# Patient Record
Sex: FEMALE | RURAL
[De-identification: names, ages, dates, MRNs, and addresses within clinical notes are randomized per-mention and may not be internally consistent; named-entity substitution may affect disease eponyms.]

---

## 2023-06-23 ENCOUNTER — APPOINTMENT (RX ONLY)
Dept: RURAL CLINIC 3 | Facility: CLINIC | Age: 73
Setting detail: DERMATOLOGY
End: 2023-06-23

## 2023-06-23 DIAGNOSIS — L82.1 OTHER SEBORRHEIC KERATOSIS: ICD-10-CM

## 2023-06-23 DIAGNOSIS — L65.0 TELOGEN EFFLUVIUM: ICD-10-CM | Status: INADEQUATELY CONTROLLED

## 2023-06-23 DIAGNOSIS — R20.2 PARESTHESIA OF SKIN: ICD-10-CM

## 2023-06-23 DIAGNOSIS — D18.0 HEMANGIOMA: ICD-10-CM

## 2023-06-23 PROBLEM — D18.01 HEMANGIOMA OF SKIN AND SUBCUTANEOUS TISSUE: Status: ACTIVE | Noted: 2023-06-23

## 2023-06-23 PROCEDURE — ? COUNSELING

## 2023-06-23 PROCEDURE — 99203 OFFICE O/P NEW LOW 30 MIN: CPT

## 2023-06-23 PROCEDURE — ? TREATMENT REGIMEN

## 2023-06-23 ASSESSMENT — LOCATION SIMPLE DESCRIPTION DERM
LOCATION SIMPLE: LEFT CALF
LOCATION SIMPLE: LEFT UPPER BACK
LOCATION SIMPLE: RIGHT SCALP
LOCATION SIMPLE: RIGHT UPPER BACK

## 2023-06-23 ASSESSMENT — LOCATION ZONE DERM
LOCATION ZONE: SCALP
LOCATION ZONE: LEG
LOCATION ZONE: TRUNK

## 2023-06-23 ASSESSMENT — LOCATION DETAILED DESCRIPTION DERM
LOCATION DETAILED: LEFT SUPERIOR UPPER BACK
LOCATION DETAILED: RIGHT MID-UPPER BACK
LOCATION DETAILED: LEFT PROXIMAL CALF
LOCATION DETAILED: RIGHT MEDIAL FRONTAL SCALP
LOCATION DETAILED: LEFT INFERIOR UPPER BACK

## 2023-06-23 ASSESSMENT — PAIN INTENSITY VAS: HOW INTENSE IS YOUR PAIN 0 BEING NO PAIN, 10 BEING THE MOST SEVERE PAIN POSSIBLE?: NO PAIN

## 2023-06-23 NOTE — PROCEDURE: TREATMENT REGIMEN
Samples Given: CeraVe- Itch Relief Moisturizing Cream
Detail Level: Zone
Plan: Discussed PO minoxidil if no improvement with above
Otc Regimen: Hair, skin, nails supplement\\nMinoxidil (Rogaine) 5% foam as directed on packaging

## 2024-03-15 ENCOUNTER — HOSPITAL ENCOUNTER (EMERGENCY)
Facility: HOSPITAL | Age: 74
Discharge: HOME | End: 2024-03-15
Attending: EMERGENCY MEDICINE
Payer: MEDICARE

## 2024-03-15 VITALS
RESPIRATION RATE: 16 BRPM | SYSTOLIC BLOOD PRESSURE: 168 MMHG | TEMPERATURE: 98.6 F | HEIGHT: 62 IN | DIASTOLIC BLOOD PRESSURE: 98 MMHG | OXYGEN SATURATION: 97 % | HEART RATE: 70 BPM | BODY MASS INDEX: 23 KG/M2 | WEIGHT: 125 LBS

## 2024-03-15 DIAGNOSIS — K21.9 GASTROESOPHAGEAL REFLUX DISEASE, UNSPECIFIED WHETHER ESOPHAGITIS PRESENT: ICD-10-CM

## 2024-03-15 DIAGNOSIS — F41.8 DEPRESSION WITH ANXIETY: Primary | ICD-10-CM

## 2024-03-15 LAB
ALBUMIN SERPL-MCNC: 4.1 G/DL (ref 3.5–5.2)
ALBUMIN/GLOB SERPL: 1.5 G/DL
ALP SERPL-CCNC: 73 U/L (ref 39–117)
ALT SERPL W P-5'-P-CCNC: 13 U/L (ref 1–33)
ANION GAP SERPL CALCULATED.3IONS-SCNC: 11.7 MMOL/L (ref 5–15)
AST SERPL-CCNC: 17 U/L (ref 1–32)
BASOPHILS # BLD AUTO: 0.04 10*3/MM3 (ref 0–0.2)
BASOPHILS NFR BLD AUTO: 0.6 % (ref 0–1.5)
BILIRUB SERPL-MCNC: 0.2 MG/DL (ref 0–1.2)
BILIRUB UR QL STRIP: NEGATIVE
BUN SERPL-MCNC: 20 MG/DL (ref 8–23)
BUN/CREAT SERPL: 24.1 (ref 7–25)
CALCIUM SPEC-SCNC: 8.9 MG/DL (ref 8.6–10.5)
CHLORIDE SERPL-SCNC: 102 MMOL/L (ref 98–107)
CLARITY UR: CLEAR
CO2 SERPL-SCNC: 22.3 MMOL/L (ref 22–29)
COLOR UR: YELLOW
CREAT SERPL-MCNC: 0.83 MG/DL (ref 0.57–1)
DEPRECATED RDW RBC AUTO: 40.6 FL (ref 37–54)
EGFRCR SERPLBLD CKD-EPI 2021: 74.5 ML/MIN/1.73
EOSINOPHIL # BLD AUTO: 0.01 10*3/MM3 (ref 0–0.4)
EOSINOPHIL NFR BLD AUTO: 0.1 % (ref 0.3–6.2)
ERYTHROCYTE [DISTWIDTH] IN BLOOD BY AUTOMATED COUNT: 12.6 % (ref 12.3–15.4)
GLOBULIN UR ELPH-MCNC: 2.8 GM/DL
GLUCOSE SERPL-MCNC: 95 MG/DL (ref 65–99)
GLUCOSE UR STRIP-MCNC: NEGATIVE MG/DL
HCT VFR BLD AUTO: 37.4 % (ref 34–46.6)
HGB BLD-MCNC: 13.1 G/DL (ref 12–15.9)
HGB UR QL STRIP.AUTO: NEGATIVE
IMM GRANULOCYTES # BLD AUTO: 0.01 10*3/MM3 (ref 0–0.05)
IMM GRANULOCYTES NFR BLD AUTO: 0.1 % (ref 0–0.5)
KETONES UR QL STRIP: NEGATIVE
LEUKOCYTE ESTERASE UR QL STRIP.AUTO: NEGATIVE
LIPASE SERPL-CCNC: 38 U/L (ref 13–60)
LYMPHOCYTES # BLD AUTO: 1 10*3/MM3 (ref 0.7–3.1)
LYMPHOCYTES NFR BLD AUTO: 14.8 % (ref 19.6–45.3)
MAGNESIUM SERPL-MCNC: 2.1 MG/DL (ref 1.6–2.4)
MCH RBC QN AUTO: 31 PG (ref 26.6–33)
MCHC RBC AUTO-ENTMCNC: 35 G/DL (ref 31.5–35.7)
MCV RBC AUTO: 88.6 FL (ref 79–97)
MONOCYTES # BLD AUTO: 0.28 10*3/MM3 (ref 0.1–0.9)
MONOCYTES NFR BLD AUTO: 4.1 % (ref 5–12)
NEUTROPHILS NFR BLD AUTO: 5.41 10*3/MM3 (ref 1.7–7)
NEUTROPHILS NFR BLD AUTO: 80.3 % (ref 42.7–76)
NITRITE UR QL STRIP: NEGATIVE
NRBC BLD AUTO-RTO: 0 /100 WBC (ref 0–0.2)
PH UR STRIP.AUTO: 5.5 [PH] (ref 4.5–8)
PLATELET # BLD AUTO: 241 10*3/MM3 (ref 140–450)
PMV BLD AUTO: 9.1 FL (ref 6–12)
POTASSIUM SERPL-SCNC: 3.9 MMOL/L (ref 3.5–5.2)
PROT SERPL-MCNC: 6.9 G/DL (ref 6–8.5)
PROT UR QL STRIP: NEGATIVE
RBC # BLD AUTO: 4.22 10*6/MM3 (ref 3.77–5.28)
SODIUM SERPL-SCNC: 136 MMOL/L (ref 136–145)
SP GR UR STRIP: <=1.005 (ref 1–1.03)
TSH SERPL DL<=0.05 MIU/L-ACNC: 2.37 UIU/ML (ref 0.27–4.2)
UROBILINOGEN UR QL STRIP: NORMAL
WBC NRBC COR # BLD AUTO: 6.75 10*3/MM3 (ref 3.4–10.8)

## 2024-03-15 PROCEDURE — 83735 ASSAY OF MAGNESIUM: CPT | Performed by: EMERGENCY MEDICINE

## 2024-03-15 PROCEDURE — 99284 EMERGENCY DEPT VISIT MOD MDM: CPT

## 2024-03-15 PROCEDURE — 80053 COMPREHEN METABOLIC PANEL: CPT | Performed by: EMERGENCY MEDICINE

## 2024-03-15 PROCEDURE — 81003 URINALYSIS AUTO W/O SCOPE: CPT | Performed by: EMERGENCY MEDICINE

## 2024-03-15 PROCEDURE — 84443 ASSAY THYROID STIM HORMONE: CPT | Performed by: EMERGENCY MEDICINE

## 2024-03-15 PROCEDURE — 36415 COLL VENOUS BLD VENIPUNCTURE: CPT

## 2024-03-15 PROCEDURE — 85025 COMPLETE CBC W/AUTO DIFF WBC: CPT | Performed by: EMERGENCY MEDICINE

## 2024-03-15 PROCEDURE — 83690 ASSAY OF LIPASE: CPT | Performed by: EMERGENCY MEDICINE

## 2024-03-15 RX ORDER — LORAZEPAM 0.5 MG/1
0.5 TABLET ORAL 2 TIMES DAILY PRN
Qty: 12 TABLET | Refills: 0 | Status: SHIPPED | OUTPATIENT
Start: 2024-03-15

## 2024-03-15 RX ORDER — LIDOCAINE HYDROCHLORIDE 20 MG/ML
10 SOLUTION OROPHARYNGEAL ONCE
Status: COMPLETED | OUTPATIENT
Start: 2024-03-15 | End: 2024-03-15

## 2024-03-15 RX ORDER — ALUMINA, MAGNESIA, AND SIMETHICONE 2400; 2400; 240 MG/30ML; MG/30ML; MG/30ML
30 SUSPENSION ORAL ONCE
Status: COMPLETED | OUTPATIENT
Start: 2024-03-15 | End: 2024-03-15

## 2024-03-15 RX ORDER — HYDROXYZINE HYDROCHLORIDE 10 MG/1
10 TABLET, FILM COATED ORAL ONCE
Status: COMPLETED | OUTPATIENT
Start: 2024-03-15 | End: 2024-03-15

## 2024-03-15 RX ORDER — PANTOPRAZOLE SODIUM 40 MG/1
40 TABLET, DELAYED RELEASE ORAL DAILY
Qty: 30 TABLET | Refills: 0 | Status: SHIPPED | OUTPATIENT
Start: 2024-03-15

## 2024-03-15 RX ADMIN — HYDROXYZINE HYDROCHLORIDE 10 MG: 10 TABLET, FILM COATED ORAL at 17:23

## 2024-03-15 RX ADMIN — ALUMINUM HYDROXIDE, MAGNESIUM HYDROXIDE, AND DIMETHICONE 30 ML: 400; 400; 40 SUSPENSION ORAL at 15:46

## 2024-03-15 RX ADMIN — LIDOCAINE HYDROCHLORIDE 10 ML: 20 SOLUTION ORAL at 15:46

## 2024-03-15 NOTE — ED NOTES
Called the [facility] to find out why a call hadn't been returned, the [facility] informed me that they never received the fax that was sent to them. Fax is being resent.

## 2024-03-15 NOTE — ED NOTES
Pt remains tearful. States no effect after meds administered. Sister at bedside, attentive to pt. Repeat VS deferred due to pt emotional state. Previous VS stable

## 2024-03-15 NOTE — ED NOTES
"Pt crying uncontrollably. States \"ever since I ate a bite of cracker my stomach isn't feeling right and I'm having another one of my episodes\". Asked pt what she thinks may help her. Pt states has rx for ativan at home which she takes occasionally with some relief. Discussed pt status with Dr. Casiano. Pt waiting for eval by [facility].   "

## 2024-03-15 NOTE — ED NOTES
"Pt crying. States \"I've taken hydroxyzine before and it doesn't work. I can't go home like this. Someone needs to find out what's wrong with me\".  Pt's sister states pt \"has not been right ever since she was diagnosed with diverticulitis\"  "

## 2024-03-15 NOTE — ED NOTES
"Up to rest room and back to bed. Pt states feels \"a little bit better, I guess\". No longer tearful. Speaking with sister at bedside. Awaiting test results then plan for eval by [facility].  "

## 2024-03-15 NOTE — ED NOTES
Patient given [facility] outpatient level of care recommendation sheet as well as 2 different psychiatry providers name, number, and address.

## 2024-03-15 NOTE — ED PROVIDER NOTES
Subjective   History of Present Illness  73-year-old female presents emergency room complaining of chronic depression anxiety and gastroesophageal reflux disease.  Patient is visiting from [place] and has been in [place] for approximately 1 month with no plans to leave as of yet.  Since she has been here the family states that she has had worsening depression and anxiety and epigastric pain.  Patient has a past medical history of gastroesophageal reflux disease for which she was told to take over-the-counter PPIs for which she states she has not done so because she did not think it was helping.  Patient is also been on several psychiatric medications and recently saw a psychiatrist here in [place] approximate 2 weeks ago who changed her medications.  Patient states that they have not helped yet and she is concerned that she is getting worse.  Patient has not been in contact with that psychiatrist in several weeks.  Patient denies SI or HI.  Patient describes midepigastric abdominal pain that is burning and associated with meals.  She states she has had an endoscopy that showed Veras's esophagus and GERD.  Patient has not been taking any medication for such for several months and is concerned why she is having continued symptoms.      Review of Systems   Constitutional:  Negative for activity change, appetite change, chills, diaphoresis, fatigue and fever.   HENT:  Negative for congestion, rhinorrhea and sore throat.    Eyes:  Negative for photophobia and visual disturbance.   Respiratory:  Negative for cough and shortness of breath.    Cardiovascular:  Negative for chest pain, palpitations and leg swelling.   Gastrointestinal:  Positive for abdominal pain. Negative for abdominal distention, diarrhea, nausea and vomiting.   Genitourinary:  Negative for dysuria and flank pain.   Musculoskeletal:  Negative for arthralgias and back pain.   Skin:  Negative for rash.   Neurological:  Negative for dizziness, weakness  and headaches.   Psychiatric/Behavioral:  Positive for behavioral problems. Negative for agitation, self-injury and suicidal ideas. The patient is nervous/anxious.        Past Medical History:   Diagnosis Date    Back injury     broke back in 2022    Veras's esophagus     Breast cancer     Broken wrist     Diverticulitis     Osteoporosis     Serotonin syndrome        Allergies   Allergen Reactions    Prednisone Other (See Comments)     Altered mental status       Past Surgical History:   Procedure Laterality Date    BACK SURGERY      FL UPPER GI ESOPHAGRAM      HYSTERECTOMY         History reviewed. No pertinent family history.    Social History     Socioeconomic History    Marital status: Single   Tobacco Use    Smoking status: Never    Smokeless tobacco: Never   Vaping Use    Vaping status: Never Used   Substance and Sexual Activity    Alcohol use: Not Currently           Objective   Physical Exam  Vitals and nursing note reviewed.   Constitutional:       General: She is not in acute distress.     Appearance: Normal appearance. She is not ill-appearing, toxic-appearing or diaphoretic.      Comments: 73-year-old female who is in no acute distress.  Patient is tearful during exam but is able to speak in full paragraphs without difficulty.   HENT:      Head: Normocephalic and atraumatic.      Nose: Nose normal.      Mouth/Throat:      Mouth: Mucous membranes are moist.   Eyes:      Conjunctiva/sclera: Conjunctivae normal.   Cardiovascular:      Rate and Rhythm: Normal rate and regular rhythm.      Pulses: Normal pulses.   Pulmonary:      Effort: Pulmonary effort is normal.      Breath sounds: Normal breath sounds.   Abdominal:      General: Abdomen is flat.   Musculoskeletal:         General: No swelling. Normal range of motion.      Cervical back: Normal range of motion and neck supple.   Skin:     General: Skin is warm and dry.   Neurological:      General: No focal deficit present.      Mental Status: She is  alert and oriented to person, place, and time.         Procedures           ED Course  ED Course as of 03/15/24 1944   Fri Mar 15, 2024   1642 Lab work is all within normal limits.  [facility] has been paged to see patient. []   1854 I spoke with Marya from [facility] after they had evaluated patient via telemedicine.  She consulted with her psychiatrist Dr. Medina and after discussing with him patient's history and physical feels that patient does not need to be inpatient at this time however he probably does need to follow-up with some type of outpatient therapy.  Initially she stated that they do not have any intensive outpatient therapy available at this time as they have a wait list further treatment and they advised that she follow-up with her primary care physician.  After I explained to them that she is visiting from [place] and has no primary care physician here she stated that she would put together a list of possible programs that they can contact and get an occluded with and Marya will fax it to us.  I agree that patient does not need inpatient therapy at this time however probably does need some outpatient therapy.  Patient alternatively can also return to see the psychiatrist which she saw few weeks ago.  I spoke with patient and family concerning findings and evaluation from [facility] and they state they understand agree with plan of discharge home with follow-up outpatient therapy.  Patient can also return the emergency room for new persistent or worsening symptoms. []   1858 Also patient has a appointment with primary care physician Dr. Dang in 4 days which also may be an avenue of help []   1931 Penelope is faxed a list of intensive outpatient therapy places.  I spoke with patient to discuss lab findings and my discussion with [facility].  Patient now also states that she has an appoint with her psychiatrist in 3 days on Monday.  She is asking for some Klonopin until she can get into see  that physician.  I told patient I would write for some Klonopin for the next several days until she can get into see her psychiatrist.  Patient states she understands agrees with plan.  Patient can also return the emergency room for new persistent or worsening symptoms. []      ED Course User Index  [] Jeffry Casiano MD                                             Medical Decision Making  My differential diagnosis for anxiety and anxiety attacks includes but is not limited to anxiety attacks, anxiety about health, situational anxiety, stress reaction, hyperventilation, diabetic ketoacidosis, amphetamine abuse, methamphetamine abuse, cocaine abuse, hyperthyroidism, antihistamines and grief reaction.     Problems Addressed:  Depression with anxiety: complicated acute illness or injury  Gastroesophageal reflux disease, unspecified whether esophagitis present: complicated acute illness or injury    Amount and/or Complexity of Data Reviewed  Labs: ordered. Decision-making details documented in ED Course.    Risk  OTC drugs.  Prescription drug management.        Final diagnoses:   Depression with anxiety   Gastroesophageal reflux disease, unspecified whether esophagitis present       ED Disposition  ED Disposition       ED Disposition   Discharge    Condition   Stable    Comment   --               Adrián Dang,   [address] [facility] [address] [phone]    Schedule an appointment as soon as possible for a visit       [facility] EMERGENCY DEPARTMENT  [address] [facility] [address] [phone]  Go to   As needed         Medication List        New Prescriptions      pantoprazole 40 MG EC tablet  Commonly known as: PROTONIX  Take 1 tablet by mouth Daily.            Changed      * LORazepam 0.5 MG tablet  Commonly known as: ATIVAN  What changed: Another medication with the same name was added. Make sure you understand how and when to take each.     * LORazepam 0.5 MG  tablet  Commonly known as: ATIVAN  Take 1 tablet by mouth 2 (Two) Times a Day As Needed for Anxiety.  What changed: You were already taking a medication with the same name, and this prescription was added. Make sure you understand how and when to take each.           * This list has 2 medication(s) that are the same as other medications prescribed for you. Read the directions carefully, and ask your doctor or other care provider to review them with you.                   Where to Get Your Medications        These medications were sent to [id number] [place] - [address] - [phone]  - [phone]   [address], [place] [address]      Phone: [phone]   LORazepam 0.5 MG tablet  pantoprazole 40 MG EC tablet            Jeffry Casiano MD  03/15/24 1949

## 2024-03-15 NOTE — ED NOTES
Marya from [facility] contacted ED with zoom link for pt eval. Pt requests that sister remain with her during eval. OK per Marya.

## 2024-03-19 ENCOUNTER — OFFICE VISIT (OUTPATIENT)
Dept: FAMILY MEDICINE CLINIC | Facility: CLINIC | Age: 74
End: 2024-03-19
Payer: MEDICARE

## 2024-03-19 VITALS
HEART RATE: 71 BPM | BODY MASS INDEX: 23 KG/M2 | RESPIRATION RATE: 18 BRPM | DIASTOLIC BLOOD PRESSURE: 78 MMHG | SYSTOLIC BLOOD PRESSURE: 116 MMHG | OXYGEN SATURATION: 99 % | HEIGHT: 62 IN | WEIGHT: 125 LBS

## 2024-03-19 DIAGNOSIS — K21.9 GASTROESOPHAGEAL REFLUX DISEASE WITHOUT ESOPHAGITIS: Primary | ICD-10-CM

## 2024-03-19 DIAGNOSIS — F41.9 ANXIETY AND DEPRESSION: ICD-10-CM

## 2024-03-19 DIAGNOSIS — M81.0 OSTEOPOROSIS WITHOUT CURRENT PATHOLOGICAL FRACTURE, UNSPECIFIED OSTEOPOROSIS TYPE: ICD-10-CM

## 2024-03-19 DIAGNOSIS — F32.A ANXIETY AND DEPRESSION: ICD-10-CM

## 2024-03-19 DIAGNOSIS — K59.00 CONSTIPATION, UNSPECIFIED CONSTIPATION TYPE: ICD-10-CM

## 2024-03-19 RX ORDER — GABAPENTIN 100 MG/1
100 CAPSULE ORAL 3 TIMES DAILY
COMMUNITY

## 2024-03-19 NOTE — PROGRESS NOTES
Adrián Dang,   [facility]  [address]  [place] [id number] [phone]    Subjective      Name Berenice Herndon MRN [id number]    1950 AGE/SEX 73 y.o. / female      Chief Complaint Chief Complaint   Patient presents with    Depression     New patient here to establish care          Visit History for  2024    History of Present Illness  Berenice Herndon is a 73 y.o. female who presented today for Depression (New patient here to establish care )  .   Acid reflux  Ended up going to the emergency room and was told that she had acid reflux. They gave her some medicine for that, but it made her stomach burn. When her stomach gets upset, she gets anxious and more depressed. She is not sure if it is just acid that is causing all her trouble. She wakes up 2 to 3 times a night to go to the bathroom and sometimes she just wakes up and usually goes back to sleep. Sometimes, she lays there for 2 to 3 hours and then goes back to sleep. Her mattress is comfortable. She goes to bed at 9:00 PM and falls asleep at 10:00 PM and wakes up at 7:00 AM. She woke up at 2:00 AM for a while. She tries not to drink water at night, but she drinks a lot of water during the day. She does not nap during the day. She still feels tired when she wakes up in the morning. The only thing that can get her up is half of the caffeine tablet. She is on Protonix.     Anxiety and depression  Has a follow-up appointment with her therapist in 4 weeks. Has always had trouble with depression and has been on Zoloft for a long time. A couple of years ago, they added BuSpar for anxiety because she broke her back. Every time she feels a little twinge, her back will start hurting again, and she gets anxious about it. It got so bad that she had pins and needles everywhere. The BuSpar did help at that time. Therapy started just before winter here, but she broke her arm. She only had a couple of visits, so she has  not seen anyone else. She has not seen a therapist before when she was first on Zoloft. In 11/2023, she started feeling a little under the weather, but in 12/2023, she had a bad year for her. Could not get out due to the snow. Was by herself, her heater quit, her water heater broke, and she broke her wrist. She got through that and was doing okay. Was helping a lady's house for a week, and her hands started shaking and she started feeling really depressed and anxious. Managed to get on the plane and come up here. Thought her symptoms would go away, but it got worse. Went to the psychiatrist, who changed her medications, which is helping a little bit. Has a follow-up appointment with the psychiatrist in 4 weeks. Has been feeling fatigued frequently. Is shaky all over, and her knees feel weak and not steady. She wakes up 2 to 3 times a night to urinate. Sometimes, she goes back to sleep. Sometimes, she lays there for 2 to 3 hours and then goes back to sleep. [place] so she does crabs, knit, quilting, grass, weeds, trimming, and gardening. She has not decided how long she will be living at home. Her sister states that she is afraid to be alone. She lives about 45 minutes from [place], [place]. Her brother lives further away. Her daughter's wife has Clarence's disease, and she cannot be over there all the time. She has to watch that she does not get upset or get sick. Nobody comes out to the house. Her sister states that since she has had diverticulitis, she has never gotten on her feet. She has been tired. She could not eat for a long time. She has not been eating because she was alone. She did not want to cook because she had no water, no heat, and her arm was broken. She worked through breast cancer and only took 1 day off after they removed it. She struggles with joint pain.     Osteoporosis  She is taking calcium, magnesium, D3, vitamin C, and collagen. She stopped taking calcium for a while because  "she ran out. She was taking magnesium, but she started being anxious and depressed.    Constipation.  Is questioning if flaxseed is good.     Health maintenance  She had diverticulitis and was treated with antibiotics. She has to take something for her bowels every day. Her diverticulitis has resolved, but she still has diverticulosis. She gets constipated.        Medications and Allergies   Current Outpatient Medications   Medication Instructions    busPIRone (BUSPAR) 10 MG tablet 1 tablet, Oral, Every 12 Hours Scheduled    gabapentin (NEURONTIN) 100 mg, Oral, 3 Times Daily    LORazepam (ATIVAN) 0.5 mg, Oral, 2 Times Daily PRN, for anxiety    LORazepam (ATIVAN) 0.5 mg, Oral, 2 Times Daily PRN    pantoprazole (PROTONIX) 40 mg, Oral, Daily    sertraline (ZOLOFT) 100 mg, Oral, 2 Times Daily     Allergies   Allergen Reactions    Iodinated Contrast Media Anaphylaxis    Prednisone Other (See Comments)     Altered mental status      I have reviewed the above medications and allergies     Objective:      Vitals Vitals:    03/19/24 1418   BP: 116/78   BP Location: Right arm   Patient Position: Sitting   Cuff Size: Adult   Pulse: 71   Resp: 18   SpO2: 99%   Weight: 56.7 kg (125 lb)   Height: 157.5 cm (62\")     Body mass index is 22.86 kg/m².    Physical Exam  Vitals reviewed.   Constitutional:       General: She is not in acute distress.     Appearance: She is not ill-appearing.   Pulmonary:      Effort: Pulmonary effort is normal.   Psychiatric:         Mood and Affect: Mood normal.         Behavior: Behavior normal.         Thought Content: Thought content normal.         Judgment: Judgment normal.            Assessment/Plan      Issues Addressed/ Plan   Diagnosis Plan   1. Gastroesophageal reflux disease without esophagitis        2. Anxiety and depression        3. Osteoporosis without current pathological fracture, unspecified osteoporosis type        4. Constipation, unspecified constipation type           There are no " Patient Instructions on file for this visit.  BMI is within normal parameters. No other follow-up for BMI required.     1. Acid reflux.  I suspect this is a combination of things. I think being alone, being in that situation, and not having anyone to help her brought her down to a new low that she has not experienced before. Will continue Protonix. Will take probiotics. Can eat Greek yogurt, and take MiraLAX. If her symptoms do not improve, we will refer her to a gastroenterologist.    2. Depression and anxiety.  It sounds like she has bad health anxiety. She will keep her appointment with the psychiatrist. She will see a therapist. She was advised to make a list of what she can do every day to help her feel better and more like herself. I will check her vitamin D and B12 at her next visit.    Osteoporosis.  Her DEXA scan did get a little worse from 2021 to 2023. She will continue calcium, magnesium, D3, vitamin C, and collagen. I will recheck her DEXA scan in the next couple of months.    Constipation.  Was advised to try natural fiber or eating an apple. Was advised not to take flaxseed because it can make diverticulitis worse. Can take Metamucil or FiberCon. Can try MiraLAX.      Follow up  recommended Return in about 3 months (around 6/19/2024).   - Dragon voice recognition software was utilized to complete this chart.  Every reasonable attempt was made to edit and correct the text, however some incorrect words may remain.   Adrián Dang      Transcribed from ambient dictation for Adrián Dang DO by Mary De La Torre.  03/19/24   16:44 EDT    Patient or patient representative verbalized consent to the visit recording.  I have personally performed the services described in this document as transcribed by the above individual, and it is both accurate and complete.

## 2024-03-27 ENCOUNTER — TELEPHONE (OUTPATIENT)
Dept: FAMILY MEDICINE CLINIC | Facility: CLINIC | Age: 74
End: 2024-03-27
Payer: MEDICARE

## 2024-03-27 DIAGNOSIS — K57.92 DIVERTICULITIS: Primary | ICD-10-CM

## 2024-03-27 RX ORDER — AMOXICILLIN AND CLAVULANATE POTASSIUM 875; 125 MG/1; MG/1
1 TABLET, FILM COATED ORAL 2 TIMES DAILY
Qty: 10 TABLET | Refills: 0 | Status: SHIPPED | OUTPATIENT
Start: 2024-03-27 | End: 2024-04-01

## 2024-03-27 NOTE — TELEPHONE ENCOUNTER
"Relay     \"Total white blood cell count was normal which is more of an indicator of infection. However, I will go ahead and send a prescription for antibiotic if she feels she may be getting diverticulitis. \"                "

## 2024-03-27 NOTE — TELEPHONE ENCOUNTER
Hi, this is Lvna Herndon. I was in to see Dr. Dang earlier this week with some abdominal problems and he noticed that my white count was up, but he didn't seem concerned about it, but I'm having abdominal pain. My bloated left quad is tender and I'm pretty sure I have diverticulitis. I've had it before, and I wondered if he would call in a prescription so I don't have to go to the emergency room

## 2024-03-27 NOTE — TELEPHONE ENCOUNTER
Total white blood cell count was normal which is more of an indicator of infection.  However, I will go ahead and send a prescription for antibiotic if she feels she may be getting diverticulitis.

## 2024-04-07 PROBLEM — K21.9 GASTROESOPHAGEAL REFLUX DISEASE WITHOUT ESOPHAGITIS: Status: ACTIVE | Noted: 2024-04-07

## 2024-04-07 PROBLEM — M81.0 OSTEOPOROSIS WITHOUT CURRENT PATHOLOGICAL FRACTURE: Status: ACTIVE | Noted: 2024-04-07

## 2024-04-07 PROBLEM — K59.00 CONSTIPATION: Status: ACTIVE | Noted: 2024-04-07

## 2024-04-07 PROBLEM — F41.9 ANXIETY AND DEPRESSION: Status: ACTIVE | Noted: 2024-04-07

## 2024-04-07 PROBLEM — F32.A ANXIETY AND DEPRESSION: Status: ACTIVE | Noted: 2024-04-07

## 2024-04-09 ENCOUNTER — HOSPITAL ENCOUNTER (OUTPATIENT)
Dept: ULTRASOUND IMAGING | Facility: HOSPITAL | Age: 74
Discharge: HOME | End: 2024-04-09
Admitting: NURSE PRACTITIONER
Payer: MEDICARE

## 2024-04-09 ENCOUNTER — OFFICE VISIT (OUTPATIENT)
Dept: GASTROENTEROLOGY | Facility: CLINIC | Age: 74
End: 2024-04-09
Payer: MEDICARE

## 2024-04-09 VITALS
DIASTOLIC BLOOD PRESSURE: 76 MMHG | HEIGHT: 62 IN | TEMPERATURE: 98.1 F | BODY MASS INDEX: 22.82 KG/M2 | HEART RATE: 88 BPM | SYSTOLIC BLOOD PRESSURE: 110 MMHG | WEIGHT: 124 LBS | OXYGEN SATURATION: 98 %

## 2024-04-09 DIAGNOSIS — K21.9 GASTROESOPHAGEAL REFLUX DISEASE, UNSPECIFIED WHETHER ESOPHAGITIS PRESENT: ICD-10-CM

## 2024-04-09 DIAGNOSIS — R10.11 RIGHT UPPER QUADRANT ABDOMINAL PAIN: ICD-10-CM

## 2024-04-09 DIAGNOSIS — R11.0 NAUSEA: Primary | ICD-10-CM

## 2024-04-09 DIAGNOSIS — K57.92 DIVERTICULITIS: ICD-10-CM

## 2024-04-09 PROCEDURE — 76705 ECHO EXAM OF ABDOMEN: CPT

## 2024-04-09 PROCEDURE — 1159F MED LIST DOCD IN RCRD: CPT | Performed by: NURSE PRACTITIONER

## 2024-04-09 PROCEDURE — 99204 OFFICE O/P NEW MOD 45 MIN: CPT | Performed by: NURSE PRACTITIONER

## 2024-04-09 PROCEDURE — 1160F RVW MEDS BY RX/DR IN RCRD: CPT | Performed by: NURSE PRACTITIONER

## 2024-04-09 RX ORDER — METRONIDAZOLE 500 MG/1
TABLET ORAL
COMMUNITY
Start: 2024-04-01

## 2024-04-09 RX ORDER — OMEPRAZOLE 40 MG/1
40 CAPSULE, DELAYED RELEASE ORAL DAILY
Qty: 30 CAPSULE | Refills: 5 | Status: SHIPPED | OUTPATIENT
Start: 2024-04-09

## 2024-04-09 NOTE — PROGRESS NOTES
"Chief Complaint   Patient presents with    Nausea    Abdominal Pain         History of Present Illness  Patient is a 73-year-old female who presents today forEvaluation.  She has a history of GERD, diverticulitis, and constipation.    Patient reports she was recently treated for diverticulitis.  She had an episode around 4 weeks ago.  This was her third episode.  She felt a clear liquid diet and was treated initially with Augmentin which she did not tolerate and then metronidazole.  Reports with the episode she was experiencing left lower quadrant pain.    Reports the pain has resolved but bowel habits have not fully normalized.  She is having bowel movements daily with no blood or mucus in the stool but feels that she is not digesting properly, stool has been fragmented.    She reports she has also been experiencing significant issues with nausea over the last 2 to 3 months.  It has been constant.  She has had 1 episode of vomiting.  She has had some epigastric and right upper quadrant discomfort.    She has a history of Veras's esophagus.  Previously taking omeprazole for GERD but more recently has been taking pantoprazole.  Would like to switch back to omeprazole.    Virtual last colonoscopy was around 8 years ago.    Reports most recent EGD and CT scan were within the last 4 to 6 months at [facility] in [place] where she lives.  She is currently in the area with family as she has been unwell.  She has been experiencing significant issues with depression and anxiety and shaking and weakness.     Result Review :       Office Visit with Adrián Dang DO (03/19/2024)    Comprehensive Metabolic Panel (03/15/2024 15:54)    CBC & Differential (03/15/2024 15:54)    US Abdomen Complete (06/30/2023 12:27)    Referral for Diverticulosis (03/25/2024)    Vital Signs:   /76   Pulse 88   Temp 98.1 °F (36.7 °C)   Ht 157.5 cm (62\")   Wt 56.2 kg (124 lb)   SpO2 98%   BMI 22.68 kg/m²     Body mass index " is 22.68 kg/m².     Physical Exam  Vitals reviewed.   Constitutional:       General: She is not in acute distress.     Appearance: She is well-developed.   HENT:      Head: Normocephalic and atraumatic.   Pulmonary:      Effort: Pulmonary effort is normal. No respiratory distress.   Abdominal:      General: Abdomen is flat. Bowel sounds are normal.      Palpations: Abdomen is soft.      Tenderness: There is abdominal tenderness in the right upper quadrant. There is guarding.   Skin:     General: Skin is dry.      Coloration: Skin is not pale.   Neurological:      Mental Status: She is alert and oriented to person, place, and time.   Psychiatric:         Thought Content: Thought content normal.           Assessment and Plan    Diagnoses and all orders for this visit:    1. Nausea (Primary)    2. Right upper quadrant abdominal pain  -     US Abdomen Limited; Future    3. Diverticulitis    4. Gastroesophageal reflux disease, unspecified whether esophagitis present    Other orders  -     omeprazole (priLOSEC) 40 MG capsule; Take 1 capsule by mouth Daily.  Dispense: 30 capsule; Refill: 5         Discussion  Patient presents today for evaluation with concerns about nausea, abdominal pain, and recent episode of diverticulitis.  We will obtain her most recent EGD and CT scan for review.  Exam in the office today with significant tenderness in the right upper quadrant.  Will schedule right upper quadrant ultrasound to evaluate the gallbladder.  If this is negative and dependent upon review of records, may consider gastric emptying study to evaluate for gastroparesis.    Diverticulitis resolved after treatment with antibiotics.  Discussed recommendation for high-fiber diet to help prevent recurrence.  Recommended colonoscopy in 6 to 8 weeks to follow-up on the episode.  Patient is uncertain if she will still be here and declined to schedule at this time.    She questions if anxiety, depression, and shaking are related to GI  symptoms.  Do not feel these are caused by any GI disorders and recommended continued follow-up with primary care provider.          Follow Up   Return for Follow up to review results after testing complete.    Patient Instructions   Schedule RUQ ultrasound to evaluate the gallbladder.     For diverticulosis, follow a high-fiber diet.  Consider starting a daily fiber supplement, such as Metamucil or Citrucel, available over-the-counter.     For GERD, continue omeprazole.  Prescription sent to pharmacy.

## 2024-04-09 NOTE — PATIENT INSTRUCTIONS
Schedule RUQ ultrasound to evaluate the gallbladder.     For diverticulosis, follow a high-fiber diet.  Consider starting a daily fiber supplement, such as Metamucil or Citrucel, available over-the-counter.     For GERD, continue omeprazole.  Prescription sent to pharmacy.

## 2024-04-29 ENCOUNTER — TELEPHONE (OUTPATIENT)
Dept: GASTROENTEROLOGY | Facility: CLINIC | Age: 74
End: 2024-04-29
Payer: MEDICARE

## 2024-04-29 NOTE — TELEPHONE ENCOUNTER
Patient left a Voice Message  requesting a call back .    She called [facility] (in [place]) at [phone], to request records of her last EGD done there on 11/01/2023. She was told by Shanna that we had to call them to request.     I called them, spoke with Medical Records, and I was told to fax them a request to F [phone].

## 2024-05-01 ENCOUNTER — TELEPHONE (OUTPATIENT)
Dept: GASTROENTEROLOGY | Facility: CLINIC | Age: 74
End: 2024-05-01
Payer: MEDICARE

## 2024-05-01 NOTE — TELEPHONE ENCOUNTER
Please let patient know that I received her previous EGD.  No need for repeat EGD at this time.  Please check in with patient to see how she is feeling.  If she has had continued nausea and reflux and abdominal pain, would recommend gastric emptying study for further evaluation.

## 2024-05-06 NOTE — TELEPHONE ENCOUNTER
Pt LVM returning call and requesting call back.    I called back, informed patient. She is feeling good, has been asymptomatic for 10-12 days.

## 2024-06-10 ENCOUNTER — OFFICE VISIT (OUTPATIENT)
Dept: FAMILY MEDICINE CLINIC | Facility: CLINIC | Age: 74
End: 2024-06-10
Payer: MEDICARE

## 2024-06-10 VITALS
HEART RATE: 67 BPM | WEIGHT: 126 LBS | DIASTOLIC BLOOD PRESSURE: 68 MMHG | SYSTOLIC BLOOD PRESSURE: 102 MMHG | RESPIRATION RATE: 16 BRPM | HEIGHT: 62 IN | BODY MASS INDEX: 23.19 KG/M2 | OXYGEN SATURATION: 97 %

## 2024-06-10 DIAGNOSIS — F32.A ANXIETY AND DEPRESSION: ICD-10-CM

## 2024-06-10 DIAGNOSIS — F41.9 ANXIETY AND DEPRESSION: ICD-10-CM

## 2024-06-10 DIAGNOSIS — Z87.81 HISTORY OF WRIST FRACTURE: ICD-10-CM

## 2024-06-10 DIAGNOSIS — G56.02 CARPAL TUNNEL SYNDROME OF LEFT WRIST: Primary | ICD-10-CM

## 2024-06-10 PROCEDURE — 1160F RVW MEDS BY RX/DR IN RCRD: CPT | Performed by: STUDENT IN AN ORGANIZED HEALTH CARE EDUCATION/TRAINING PROGRAM

## 2024-06-10 PROCEDURE — 1159F MED LIST DOCD IN RCRD: CPT | Performed by: STUDENT IN AN ORGANIZED HEALTH CARE EDUCATION/TRAINING PROGRAM

## 2024-06-10 PROCEDURE — 99214 OFFICE O/P EST MOD 30 MIN: CPT | Performed by: STUDENT IN AN ORGANIZED HEALTH CARE EDUCATION/TRAINING PROGRAM

## 2024-06-10 RX ORDER — SERTRALINE HYDROCHLORIDE 100 MG/1
200 TABLET, FILM COATED ORAL DAILY
Status: SHIPPED | COMMUNITY
Start: 2024-06-10

## 2024-06-11 ENCOUNTER — TELEPHONE (OUTPATIENT)
Dept: GASTROENTEROLOGY | Facility: CLINIC | Age: 74
End: 2024-06-11
Payer: MEDICARE

## 2024-06-11 NOTE — TELEPHONE ENCOUNTER
"Patient left a Voice Message stating that days ago she received a letter with a \"preliminary form\" to fill out for EGD/CLS.   [place], now she is back in [place] and would like another copy to complete it.    [phone]     "

## 2024-06-13 ENCOUNTER — PREP FOR SURGERY (OUTPATIENT)
Dept: SURGERY | Facility: SURGERY CENTER | Age: 74
End: 2024-06-13
Payer: MEDICARE

## 2024-06-13 DIAGNOSIS — K57.92 DIVERTICULITIS: Primary | ICD-10-CM

## 2024-06-13 RX ORDER — SODIUM CHLORIDE 0.9 % (FLUSH) 0.9 %
3 SYRINGE (ML) INJECTION EVERY 12 HOURS SCHEDULED
OUTPATIENT
Start: 2024-06-13

## 2024-06-13 RX ORDER — SODIUM CHLORIDE, SODIUM LACTATE, POTASSIUM CHLORIDE, CALCIUM CHLORIDE 600; 310; 30; 20 MG/100ML; MG/100ML; MG/100ML; MG/100ML
30 INJECTION, SOLUTION INTRAVENOUS CONTINUOUS PRN
OUTPATIENT
Start: 2024-06-13

## 2024-06-13 RX ORDER — SODIUM CHLORIDE 0.9 % (FLUSH) 0.9 %
10 SYRINGE (ML) INJECTION AS NEEDED
OUTPATIENT
Start: 2024-06-13

## 2024-06-14 ENCOUNTER — TELEPHONE (OUTPATIENT)
Dept: GASTROENTEROLOGY | Facility: CLINIC | Age: 74
End: 2024-06-14
Payer: MEDICARE

## 2024-06-17 NOTE — PROGRESS NOTES
Adrián Dang,   [facility]  [address]  [place] [id number] [phone]    Subjective      Name Berenice Herndon MRN [id number]    1950 AGE/SEX 73 y.o. / female      Chief Complaint Chief Complaint   Patient presents with    Depression     Would like to discuss changing medication     Abdominal Pain     X 1 week nausea and cramping          Visit History for  06/10/2024    Berenice Herndon is a 73 y.o. female who presented today for Depression (Would like to discuss changing medication ) and Abdominal Pain (X 1 week nausea and cramping )     History of Present Illness  The patient is experiencing gastrointestinal discomfort, including nausea, for which she is currently on Prilosec.    The patient is also grappling with depression. She sought psychiatric consultation and was prescribed Rexulti, a medication she can not afford. Gabapentin was trialed but proved ineffective. Remeron was initiated 2 to 3 years ago, which she discontinued due to perceived weight gain. Currently, she is contemplating resuming Remeron, which she found beneficial. She reports persistent tremors in her hands and knees, which commenced prior to her depression. Her sleep pattern was disrupted last night, but prior to that, she had poor sleep quality. Her psychiatrist increased her Zoloft dosage from 100 mg to 200 mg, and BuSpar dosage from 7.5 mg to 10 mg twice daily. The accompanying adult male reports that her depression improved until she decided to return to [place] on Wednesday. The thought of returning home is exacerbating her depression and nervousness. She expresses concern about her ability to play golf due to her depression.    The patient sustained a wrist fracture while residing in [place], which initially affected her thumb but has since spread to all fingers. She expresses a desire to address this condition. The accompanying adult male inquires about the cause of her finger  "numbness post-fracture.       Medications and Allergies   Current Outpatient Medications   Medication Instructions    busPIRone (BUSPAR) 10 MG tablet 1 tablet, Oral, Every 12 Hours Scheduled    LORazepam (ATIVAN) 0.5 mg, Oral, 2 Times Daily PRN, for anxiety    omeprazole (PRILOSEC) 40 mg, Oral, Daily    sertraline (ZOLOFT) 200 mg, Oral, Daily     Allergies   Allergen Reactions    Iodinated Contrast Media Anaphylaxis    Gabapentin Other (See Comments)     Fatigue     Prednisone Other (See Comments)     Altered mental status      I have reviewed the above medications and allergies     Objective:      Vitals Vitals:    06/10/24 0956   BP: 102/68   BP Location: Left arm   Patient Position: Sitting   Cuff Size: Adult   Pulse: 67   Resp: 16   SpO2: 97%   Weight: 57.2 kg (126 lb)   Height: 157.5 cm (62\")     Body mass index is 23.05 kg/m².    Physical Exam  Vitals reviewed.   Constitutional:       General: She is not in acute distress.     Appearance: She is not ill-appearing.   Pulmonary:      Effort: Pulmonary effort is normal.      Breath sounds: Normal breath sounds.   Neurological:      Mental Status: She is alert.   Psychiatric:         Mood and Affect: Mood normal.         Behavior: Behavior normal.         Thought Content: Thought content normal.         Judgment: Judgment normal.        Physical Exam       Results       Assessment/Plan   Issues Addressed/ Plan   Diagnosis Plan   1. Carpal tunnel syndrome of left wrist  Ambulatory Referral to Hand Surgery      2. History of wrist fracture  Ambulatory Referral to Hand Surgery      3. Anxiety and depression           Assessment & Plan  1. Depression.  The patient's depression is currently rated at 10. We explored various medication options, including Vraylar, Abilify, and Rexulti. The potential side effects, risks, and benefits were thoroughly discussed. The patient was advised to consult with a therapist. We emphasized the importance of prioritizing her " "decision-making process. We provided her with a book detailing \"Get out of Mind and into Life\".    The numbness in her fingers is likely due to a pinched nerve at the carpal tunnel. A referral to hand surgery will be made.    Follow-up  The patient is scheduled for a follow-up visit in a few months.     BMI is within normal parameters. No other follow-up for BMI required.     There are no Patient Instructions on file for this visit.   Follow up  recommended No follow-ups on file.   - Dragon voice recognition software was utilized to complete this chart.  Every reasonable attempt was made to edit and correct the text, however some incorrect words may remain.   Adrián Dang DO    Patient or patient representative verbalized consent for the use of Ambient Listening during the visit with  Adrián Dang DO for chart documentation. 6/17/2024  13:02 EDT    "

## 2024-06-20 ENCOUNTER — ANESTHESIA EVENT (OUTPATIENT)
Dept: SURGERY | Facility: SURGERY CENTER | Age: 74
End: 2024-06-20
Payer: MEDICARE

## 2024-06-20 ENCOUNTER — HOSPITAL ENCOUNTER (OUTPATIENT)
Facility: SURGERY CENTER | Age: 74
Setting detail: HOSPITAL OUTPATIENT SURGERY
Discharge: HOME | End: 2024-06-20
Attending: INTERNAL MEDICINE | Admitting: INTERNAL MEDICINE
Payer: MEDICARE

## 2024-06-20 ENCOUNTER — ANESTHESIA (OUTPATIENT)
Dept: SURGERY | Facility: SURGERY CENTER | Age: 74
End: 2024-06-20
Payer: MEDICARE

## 2024-06-20 VITALS
BODY MASS INDEX: 22.63 KG/M2 | HEIGHT: 62 IN | DIASTOLIC BLOOD PRESSURE: 83 MMHG | WEIGHT: 123 LBS | RESPIRATION RATE: 16 BRPM | OXYGEN SATURATION: 99 % | SYSTOLIC BLOOD PRESSURE: 132 MMHG | TEMPERATURE: 98 F | HEART RATE: 59 BPM

## 2024-06-20 DIAGNOSIS — K57.92 DIVERTICULITIS: ICD-10-CM

## 2024-06-20 PROCEDURE — 25810000003 LACTATED RINGERS PER 1000 ML: Performed by: INTERNAL MEDICINE

## 2024-06-20 PROCEDURE — 25010000002 PROPOFOL 1000 MG/100ML EMULSION: Performed by: STUDENT IN AN ORGANIZED HEALTH CARE EDUCATION/TRAINING PROGRAM

## 2024-06-20 PROCEDURE — 88305 TISSUE EXAM BY PATHOLOGIST: CPT | Performed by: INTERNAL MEDICINE

## 2024-06-20 PROCEDURE — 45380 COLONOSCOPY AND BIOPSY: CPT | Performed by: INTERNAL MEDICINE

## 2024-06-20 PROCEDURE — 25010000002 LIDOCAINE 1 % SOLUTION: Performed by: STUDENT IN AN ORGANIZED HEALTH CARE EDUCATION/TRAINING PROGRAM

## 2024-06-20 RX ORDER — MIRTAZAPINE 15 MG/1
15 TABLET, ORALLY DISINTEGRATING ORAL NIGHTLY
COMMUNITY

## 2024-06-20 RX ORDER — LANOLIN ALCOHOL/MO/W.PET/CERES
50 CREAM (GRAM) TOPICAL DAILY
COMMUNITY

## 2024-06-20 RX ORDER — SODIUM CHLORIDE 0.9 % (FLUSH) 0.9 %
10 SYRINGE (ML) INJECTION AS NEEDED
Status: DISCONTINUED | OUTPATIENT
Start: 2024-06-20 | End: 2024-06-20 | Stop reason: HOSPADM

## 2024-06-20 RX ORDER — MULTIVIT WITH MINERALS/LUTEIN
1000 TABLET ORAL DAILY
COMMUNITY

## 2024-06-20 RX ORDER — SODIUM CHLORIDE, SODIUM LACTATE, POTASSIUM CHLORIDE, CALCIUM CHLORIDE 600; 310; 30; 20 MG/100ML; MG/100ML; MG/100ML; MG/100ML
30 INJECTION, SOLUTION INTRAVENOUS CONTINUOUS PRN
Status: DISCONTINUED | OUTPATIENT
Start: 2024-06-20 | End: 2024-06-20 | Stop reason: HOSPADM

## 2024-06-20 RX ORDER — LIDOCAINE HYDROCHLORIDE 10 MG/ML
INJECTION, SOLUTION INFILTRATION; PERINEURAL AS NEEDED
Status: DISCONTINUED | OUTPATIENT
Start: 2024-06-20 | End: 2024-06-20 | Stop reason: SURG

## 2024-06-20 RX ORDER — SODIUM CHLORIDE 0.9 % (FLUSH) 0.9 %
3 SYRINGE (ML) INJECTION EVERY 12 HOURS SCHEDULED
Status: DISCONTINUED | OUTPATIENT
Start: 2024-06-20 | End: 2024-06-20 | Stop reason: HOSPADM

## 2024-06-20 RX ORDER — PROPOFOL 10 MG/ML
INJECTION, EMULSION INTRAVENOUS AS NEEDED
Status: DISCONTINUED | OUTPATIENT
Start: 2024-06-20 | End: 2024-06-20 | Stop reason: SURG

## 2024-06-20 RX ADMIN — LIDOCAINE HYDROCHLORIDE 30 MG: 10 INJECTION, SOLUTION INFILTRATION; PERINEURAL at 13:26

## 2024-06-20 RX ADMIN — SODIUM CHLORIDE, POTASSIUM CHLORIDE, SODIUM LACTATE AND CALCIUM CHLORIDE 30 ML/HR: 600; 310; 30; 20 INJECTION, SOLUTION INTRAVENOUS at 12:21

## 2024-06-20 RX ADMIN — PROPOFOL 50 MG: 10 INJECTION, EMULSION INTRAVENOUS at 13:26

## 2024-06-20 RX ADMIN — PROPOFOL 140 MCG/KG/MIN: 10 INJECTION, EMULSION INTRAVENOUS at 13:27

## 2024-06-20 NOTE — H&P
No chief complaint on file.      HPI  Patient here today for screening colonoscopy.  She has a history of recent diverticulitis.         Problem List:    Patient Active Problem List   Diagnosis    Gastroesophageal reflux disease without esophagitis    Anxiety and depression    Osteoporosis without current pathological fracture    Constipation    Diverticulitis       Medical History:    Past Medical History:   Diagnosis Date    Allergic     Anxiety     Arthritis     Back injury     broke back in 2022    Veras's esophagus     Breast cancer     Broken wrist     Depression     Diverticulitis     Eating disorder     GERD (gastroesophageal reflux disease)     HL (hearing loss)     Osteopenia     Osteoporosis     Serotonin syndrome     Tremor         Social History:    Social History     Socioeconomic History    Marital status: Single   Tobacco Use    Smoking status: Never     Passive exposure: Never    Smokeless tobacco: Never   Vaping Use    Vaping status: Never Used   Substance and Sexual Activity    Alcohol use: Never    Drug use: Never    Sexual activity: Not Currently     Birth control/protection: Post-menopausal, None       Family History:   Family History   Problem Relation Age of Onset    Arthritis Mother     Arthritis Father     Colon cancer Neg Hx     Colon polyps Neg Hx     Crohn's disease Neg Hx     Irritable bowel syndrome Neg Hx     Ulcerative colitis Neg Hx        Surgical History:   Past Surgical History:   Procedure Laterality Date    BACK SURGERY      BREAST SURGERY      COLONOSCOPY      FL UPPER GI ESOPHAGRAM      HYSTERECTOMY      UPPER GASTROINTESTINAL ENDOSCOPY           Current Facility-Administered Medications:     lactated ringers infusion, 30 mL/hr, Intravenous, Continuous PRN, Declan, Michelle G, APRN    sodium chloride 0.9 % flush 10 mL, 10 mL, Intravenous, PRN, Declan, Michelle G, APRN    sodium chloride 0.9 % flush 3 mL, 3 mL, Intravenous, Q12H, Declan, Michelle G, APRN    Allergies:   Allergies    Allergen Reactions    Iodinated Contrast Media Anaphylaxis    Gabapentin Other (See Comments)     Fatigue     Prednisone Other (See Comments)     Altered mental status        The following portions of the patient's history were reviewed by me and updated as appropriate: review of systems, allergies, current medications, past family history, past medical history, past social history, past surgical history and problem list.    There were no vitals filed for this visit.    PHYSICAL EXAM:    CONSTITUTIONAL:  today's vital signs reviewed by me  GASTROINTESTINAL: abdomen is soft nontender nondistended with normal active bowel sounds, no masses are appreciated    Assessment/ Plan  We will proceed today with screening colonoscopy.    Risks and benefits as well as alternatives to endoscopic evaluation were explained to the patient and they voiced understanding and wish to proceed.  These risks include but are not limited to the risk of bleeding, perforation, adverse reaction to sedation, and missed lesions.  The patient was given the opportunity to ask questions prior to the endoscopic procedure.

## 2024-06-20 NOTE — ANESTHESIA POSTPROCEDURE EVALUATION
Patient: Berenice Herndon    Procedure Summary       Date: 06/20/24 Room / Location: [facility] / [facility]    Anesthesia Start: 1322 Anesthesia Stop: 1347    Procedure: COLONOSCOPY to Cecum with Polypectomy Diagnosis:       Diverticulitis      (Diverticulitis [K57.92])    Surgeons: Cb Tinoco MD Provider: Lola Sanches MD    Anesthesia Type: MAC ASA Status: 2            Anesthesia Type: MAC    Vitals  Vitals Value Taken Time   BP 99/63 06/20/24 1349   Temp 36.7 °C (98 °F) 06/20/24 1348   Pulse 56 06/20/24 1349   Resp 16 06/20/24 1348   SpO2 97 % 06/20/24 1349   Vitals shown include unfiled device data.        Post Anesthesia Care and Evaluation    Patient location during evaluation: PACU  Patient participation: complete - patient participated  Level of consciousness: awake  Pain management: adequate    Airway patency: patent  Anesthetic complications: No anesthetic complications  PONV Status: none  Cardiovascular status: stable  Respiratory status: acceptable  Hydration status: acceptable

## 2024-06-21 LAB
LAB AP CASE REPORT: NORMAL
PATH REPORT.FINAL DX SPEC: NORMAL
PATH REPORT.GROSS SPEC: NORMAL

## 2024-06-24 ENCOUNTER — TELEPHONE (OUTPATIENT)
Dept: FAMILY MEDICINE CLINIC | Facility: CLINIC | Age: 74
End: 2024-06-24
Payer: MEDICARE

## 2024-06-24 NOTE — TELEPHONE ENCOUNTER
Caller: Berenice Herndon    Relationship: Self    Best call back number: [phone]     What is the best time to reach you: ANYTIME    Who are you requesting to speak with (clinical staff, provider,  specific staff member): CLINICAL    What was the call regarding: PATIENT CALLING TO FOLLOW UP ON THE ORTHOPEDIC REFERRAL SHE STATED NO ONE HAS CALLED TO SCHEDULE AN APPOINTMENT    Is it okay if the provider responds through MyChart: NO    DELETE AFTER REVIEWING: Send this encounter to the appropriate pool. See your Call Action Grid or Workflows for direction.

## 2024-06-27 ENCOUNTER — TELEPHONE (OUTPATIENT)
Dept: GASTROENTEROLOGY | Facility: CLINIC | Age: 74
End: 2024-06-27
Payer: MEDICARE

## 2024-06-27 NOTE — TELEPHONE ENCOUNTER
Patient left a Voice Message, returning call from RN and requesting a call back with results of recent procedures.

## 2025-04-07 ENCOUNTER — OFFICE VISIT (OUTPATIENT)
Dept: FAMILY MEDICINE CLINIC | Facility: CLINIC | Age: 75
End: 2025-04-07
Payer: MEDICARE

## 2025-04-07 VITALS
HEART RATE: 80 BPM | HEIGHT: 61 IN | OXYGEN SATURATION: 96 % | WEIGHT: 132 LBS | SYSTOLIC BLOOD PRESSURE: 128 MMHG | BODY MASS INDEX: 24.92 KG/M2 | DIASTOLIC BLOOD PRESSURE: 78 MMHG | RESPIRATION RATE: 14 BRPM

## 2025-04-07 DIAGNOSIS — R63.8 WEIGHT DISORDER: ICD-10-CM

## 2025-04-07 DIAGNOSIS — K80.50 BILIARY COLIC: Primary | ICD-10-CM

## 2025-04-07 PROCEDURE — 99213 OFFICE O/P EST LOW 20 MIN: CPT | Performed by: STUDENT IN AN ORGANIZED HEALTH CARE EDUCATION/TRAINING PROGRAM

## 2025-04-07 PROCEDURE — 1159F MED LIST DOCD IN RCRD: CPT | Performed by: STUDENT IN AN ORGANIZED HEALTH CARE EDUCATION/TRAINING PROGRAM

## 2025-04-07 PROCEDURE — 1160F RVW MEDS BY RX/DR IN RCRD: CPT | Performed by: STUDENT IN AN ORGANIZED HEALTH CARE EDUCATION/TRAINING PROGRAM

## 2025-04-07 RX ORDER — SERTRALINE HYDROCHLORIDE 100 MG/1
100 TABLET, FILM COATED ORAL DAILY
COMMUNITY

## 2025-04-07 NOTE — PROGRESS NOTES
Adrián Dang,   [facility]  [address]  [place] [id number] [phone]    Subjective      Name Berenice Herndon MRN [id number]    1950 AGE/SEX 74 y.o. / female      Chief Complaint Chief Complaint   Patient presents with    Constipation     Having issues with bowels, has been having a lot of mucus in stool. Stool is small pieces. Abdominal pain. Issues has been going on for the past few months. Stomach feels nauseated off an on.          Visit History for  2025    Berenice Herndon is a 74 y.o. female who presented today for Constipation (Having issues with bowels, has been having a lot of mucus in stool. Stool is small pieces. Abdominal pain. Issues has been going on for the past few months. Stomach feels nauseated off an on. )       History of Present Illness  The patient presents for evaluation of abdominal pain, weight gain, and mental health.    She reports experiencing irregular bowel movements characterized by brown, short, finger-sized stools with significant mucus content. She does not experience any associated pain. She also describes abdominal discomfort, which she perceives as bloating, occurring regardless of her food intake. This discomfort can manifest up to 2 or 3 times daily, often accompanied by nausea, although she has not experienced any episodes of vomiting. She is currently on Prilosec for these symptoms. She has a history of diverticulitis diagnosed 2 years ago, with intermittent abdominal pain since then. She was previously diagnosed with a hiatal hernia and acid reflux.    She expresses concern over recent weight gain, estimating an excess of 10 pounds over her ideal weight. Despite maintaining a daily walking routine, she reports feeling fatigued and experiencing leg weakness and hand tremors. She also reports a sore throat over the past few days, which she suspects may be related to her acid reflux. She is currently on Prilosec for  "these symptoms.    She was hospitalized for mental health issues at the end of last year, during which her medication was switched from Zoloft to Lexapro. However, due to weight gain associated with Lexapro, she reverted to Zoloft under her doctor's guidance. She is currently on Zoloft 100 mg. She developed a urinary tract infection (UTI) two days into her hospital stay, a condition she had not previously experienced.    MEDICATIONS  Current: Prilosec, Zoloft  Past: Lexapro       Medications and Allergies   Current Outpatient Medications   Medication Instructions    alendronate (FOSAMAX) 70 mg, Every 7 Days    amoxicillin-clavulanate (AUGMENTIN) 875-125 MG per tablet 1 tablet, Oral, 2 Times Daily    ascorbic acid (VITAMIN C) 1,000 mg, Daily    Calcium Carb-Cholecalciferol (CALCIUM 500 + D PO) 1 tablet, 2 Times Daily    hydrOXYzine (ATARAX) 25 mg, 3 Times Daily PRN    omeprazole (PRILOSEC) 40 mg, Oral, Daily    sertraline (ZOLOFT) 100 mg, Daily    vitamin B-6 (PYRIDOXINE) 50 mg, Daily     Allergies   Allergen Reactions    Iodinated Contrast Media Anaphylaxis    Gabapentin Other (See Comments)     Fatigue     Prednisone Other (See Comments)     Altered mental status      I have reviewed the above medications and allergies     Objective:      Vitals Vitals:    04/07/25 1549   BP: 128/78   BP Location: Left arm   Patient Position: Sitting   Cuff Size: Adult   Pulse: 80   Resp: 14   SpO2: 96%   Weight: 59.9 kg (132 lb)   Height: 154.9 cm (61\")     Body mass index is 24.94 kg/m².    Physical Exam     Physical Exam  Heart was examined.  Positive Campbell's sign on the right side of the abdomen.     Results  Imaging  Ultrasound shows gallbladder is well distended with no gallstones, wall thickening or pericholecystic fluid.     Assessment/Plan   Issues Addressed/ Plan   Diagnosis Plan   1. Biliary colic        2. Weight disorder           Assessment & Plan  1. Biliary colic.  Her symptoms, including mucous stools, " postprandial pain, and nausea, suggest biliary colic. The presence of a positive Campbell's sign on the right side further supports this diagnosis. An ultrasound revealed a distended gallbladder without gallstones, wall thickening, or pericholecystic fluid. The possibility of biliary sludge was discussed. She was advised to maintain adequate hydration and avoid fatty, greasy, and fried foods. A HIDA scan has been ordered to confirm the diagnosis. If the condition worsens, she should inform us immediately so that the process can be expedited.    2. Weight gain.  She expressed concern about weight gain. Her current weight is within the normal range. She was reassured that her weight is currently appropriate and advised to continue her daily walking routine unless it is raining.    3. Mental health management.  She was previously hospitalized for mental health issues and was switched from Zoloft to Lexapro, which caused weight gain. She has since switched back to Zoloft 100 mg with her doctor's approval. She reports doing okay on Zoloft.     BMI is within normal parameters. No other follow-up for BMI required.     There are no Patient Instructions on file for this visit.   Follow up  recommended No follow-ups on file.   - Dragon voice recognition software was utilized to complete this chart.  Every reasonable attempt was made to edit and correct the text, however some incorrect words may remain.   Adrián Dang DO    Patient or patient representative verbalized consent for the use of Ambient Listening during the visit with  Adrián Dang DO for chart documentation. 4/21/2025  03:40 EDT

## 2025-04-09 ENCOUNTER — TELEPHONE (OUTPATIENT)
Dept: FAMILY MEDICINE CLINIC | Facility: CLINIC | Age: 75
End: 2025-04-09
Payer: MEDICARE

## 2025-04-09 RX ORDER — OMEPRAZOLE 40 MG/1
40 CAPSULE, DELAYED RELEASE ORAL DAILY
Qty: 30 CAPSULE | Refills: 5 | Status: SHIPPED | OUTPATIENT
Start: 2025-04-09

## 2025-04-09 NOTE — TELEPHONE ENCOUNTER
Caller: Berenice Herndon    Relationship: Self    Requested Prescriptions:   Requested Prescriptions     Pending Prescriptions Disp Refills    omeprazole (priLOSEC) 40 MG capsule 30 capsule 5     Sig: Take 1 capsule by mouth Daily.      Pharmacy where request should be sent: [id number] - [place], [place] - [address] [phone] [facility] [phone] FX     Last office visit with prescribing clinician: 4/7/2025   Last telemedicine visit with prescribing clinician: Visit date not found   Next office visit with prescribing clinician: Visit date not found     Additional details provided by patient: PATIENT HAS ONE LEFT.      Does the patient have less than a 3 day supply:  [x] Yes  [] No    Would you like a call back once the refill request has been completed: [] Yes [x] No    If the office needs to give you a call back, can they leave a voicemail: [] Yes [x] No    Alfredo He Rep   04/09/25 09:17 EDT

## 2025-04-09 NOTE — TELEPHONE ENCOUNTER
Caller: Berenice Herndon    Relationship to patient: Self    Best call back number:      Patient is needing: CALL PATIENT REGARDING GETTING THE CT SCAN SCHEDULED.

## 2025-04-16 ENCOUNTER — TELEPHONE (OUTPATIENT)
Dept: FAMILY MEDICINE CLINIC | Facility: CLINIC | Age: 75
End: 2025-04-16
Payer: MEDICARE

## 2025-04-16 ENCOUNTER — HOSPITAL ENCOUNTER (OUTPATIENT)
Dept: NUCLEAR MEDICINE | Facility: HOSPITAL | Age: 75
Discharge: HOME | End: 2025-04-16
Admitting: STUDENT IN AN ORGANIZED HEALTH CARE EDUCATION/TRAINING PROGRAM
Payer: MEDICARE

## 2025-04-16 DIAGNOSIS — K57.92 DIVERTICULITIS: Primary | ICD-10-CM

## 2025-04-16 DIAGNOSIS — K80.50 BILIARY COLIC: ICD-10-CM

## 2025-04-16 PROCEDURE — A9537 TC99M MEBROFENIN: HCPCS | Performed by: STUDENT IN AN ORGANIZED HEALTH CARE EDUCATION/TRAINING PROGRAM

## 2025-04-16 PROCEDURE — 78226 HEPATOBILIARY SYSTEM IMAGING: CPT

## 2025-04-16 PROCEDURE — 34310000005 TECHNETIUM TC 99M MEBROFENIN KIT: Performed by: STUDENT IN AN ORGANIZED HEALTH CARE EDUCATION/TRAINING PROGRAM

## 2025-04-16 RX ORDER — KIT FOR THE PREPARATION OF TECHNETIUM TC 99M MEBROFENIN 45 MG/10ML
1 INJECTION, POWDER, LYOPHILIZED, FOR SOLUTION INTRAVENOUS
Status: COMPLETED | OUTPATIENT
Start: 2025-04-16 | End: 2025-04-16

## 2025-04-16 RX ADMIN — MEBROFENIN 1 DOSE: 45 INJECTION, POWDER, LYOPHILIZED, FOR SOLUTION INTRAVENOUS at 10:14

## 2025-04-16 NOTE — TELEPHONE ENCOUNTER
Caller: Berenice Herndon    Relationship: Self    Best call back number:[phone]    Who are you requesting to speak with (clinical staff, provider,  specific staff member): CLINICAL    What was the call regarding: PATIENT STATES SHE IS STILL HAVING BOWEL ISSUES. PATIENT STATES SHE THINKS IT'S DIVERTICULITIS. PATIENT ASKS IF PROVIDER WOULD PRESCRIBE ANTIBIOTICS OR ORDER COLONOSCOPY? WHAT DOES PROVIDER ADVISE?

## 2025-04-17 ENCOUNTER — TELEPHONE (OUTPATIENT)
Dept: FAMILY MEDICINE CLINIC | Facility: CLINIC | Age: 75
End: 2025-04-17
Payer: MEDICARE

## 2025-04-17 NOTE — TELEPHONE ENCOUNTER
Caller: Berenice Herndon    Relationship: Self    Best call back number: [phone]     Who are you requesting to speak with (clinical staff, provider,  specific staff member): CLINICAL STAFF    What was the call regarding: PATIENT STATES THAT SHE SAW DR OSHEA ON MONDAY FOR BOWEL ISSUE. SHE SEEMS TO BE DOING WORSE, AND HER BOWEL MOVEMENTS ARE BECOMING UNCONTROLLABLE AND WITH MUCUS. REQUESTS CALL BACK TO DISCUSS FURTHER CONCERNS.

## 2025-08-18 ENCOUNTER — TELEPHONE (OUTPATIENT)
Dept: FAMILY MEDICINE CLINIC | Facility: CLINIC | Age: 75
End: 2025-08-18

## 2025-08-19 ENCOUNTER — OFFICE VISIT (OUTPATIENT)
Age: 75
End: 2025-08-19
Payer: MEDICARE

## 2025-08-19 DIAGNOSIS — F33.1 MAJOR DEPRESSIVE DISORDER, RECURRENT EPISODE, MODERATE: Primary | ICD-10-CM

## 2025-08-19 DIAGNOSIS — F41.1 GENERALIZED ANXIETY DISORDER: ICD-10-CM

## 2025-08-22 ENCOUNTER — PATIENT ROUNDING (BHMG ONLY) (OUTPATIENT)
Age: 75
End: 2025-08-22
Payer: MEDICARE

## 2025-08-29 ENCOUNTER — TELEPHONE (OUTPATIENT)
Age: 75
End: 2025-08-29
Payer: MEDICARE

## (undated) DEVICE — FLEX ADVANTAGE 1500CC: Brand: FLEX ADVANTAGE

## (undated) DEVICE — VIAL FORMLN CAP 10PCT 20ML

## (undated) DEVICE — ADAPT CLN SCPE ENDO PORPOISE BX/50 DISP

## (undated) DEVICE — SINGLE-USE BIOPSY FORCEPS: Brand: RADIAL JAW 4

## (undated) DEVICE — CANN O2 ETCO2 FITS ALL CONN CO2 SMPL A/ 7IN DISP LF

## (undated) DEVICE — Device

## (undated) DEVICE — GOWN ISOL W/THUMB UNIV BLU BX/15

## (undated) DEVICE — GOWN ,SIRUS,NONREINFORCED 3XL: Brand: MEDLINE

## (undated) DEVICE — KT ORCA ORCAPOD DISP STRL

## (undated) DEVICE — SYRINGE, LUER SLIP, STERILE, 60ML: Brand: MEDLINE